# Patient Record
Sex: FEMALE | Race: WHITE | NOT HISPANIC OR LATINO | Employment: OTHER | URBAN - METROPOLITAN AREA
[De-identification: names, ages, dates, MRNs, and addresses within clinical notes are randomized per-mention and may not be internally consistent; named-entity substitution may affect disease eponyms.]

---

## 2017-01-26 ENCOUNTER — PATIENT MESSAGE (OUTPATIENT)
Dept: FAMILY MEDICINE | Facility: CLINIC | Age: 82
End: 2017-01-26

## 2017-01-26 ENCOUNTER — TELEPHONE (OUTPATIENT)
Dept: FAMILY MEDICINE | Facility: CLINIC | Age: 82
End: 2017-01-26

## 2017-01-26 RX ORDER — CIPROFLOXACIN 500 MG/1
TABLET ORAL
Qty: 14 TABLET | Refills: 0 | Status: SHIPPED | OUTPATIENT
Start: 2017-01-26 | End: 2017-07-05

## 2017-01-26 NOTE — TELEPHONE ENCOUNTER
----- Message from Payton Romo sent at 1/26/2017  2:56 PM CST -----  Melissa/chago Brooks called to say sent e-mail to doctor on MyChart   Wants to make sure they hear from before the end of the day      reach at   [phone]

## 2017-01-26 NOTE — TELEPHONE ENCOUNTER
Notified pt's daughter Cordelia that email was forwarded to  and that he will see it when he is finished seeing patients for the day    Angel will fax: [phone]

## 2017-01-27 NOTE — TELEPHONE ENCOUNTER
Can you please clarify this sig?  ciprofloxacin HCl (CIPRO) 500 MG tablet 14 tablet 0 1/26/2017  No      Sig: TK 1 T PO  BID TAT

## 2017-01-27 NOTE — TELEPHONE ENCOUNTER
----- Message from Dalia Bland sent at 1/27/2017 10:10 AM CST -----  Contact: chago..daughter  Need order from  to give cipro rx to mother.she is a resident at [facility]. Fax to [phone]

## 2017-05-25 ENCOUNTER — PATIENT MESSAGE (OUTPATIENT)
Dept: FAMILY MEDICINE | Facility: CLINIC | Age: 82
End: 2017-05-25

## 2017-05-25 ENCOUNTER — TELEPHONE (OUTPATIENT)
Dept: FAMILY MEDICINE | Facility: CLINIC | Age: 82
End: 2017-05-25

## 2017-05-25 DIAGNOSIS — G50.0 TRIGEMINAL NEURALGIA: ICD-10-CM

## 2017-05-25 NOTE — TELEPHONE ENCOUNTER
Per daughter and Angel, Pt only takes two capsules by mouth every evening.  Please send a copy of the script to [facility] [place] at fax [phone]

## 2017-05-25 NOTE — TELEPHONE ENCOUNTER
----- Message from Rae Armas sent at 5/25/2017  3:07 PM CDT -----  Contact: [place] daughter  REFILLS:   Patient is requesting a medication refill.   RX name: Carbamazipine   Strength: 200 mg  Directions: take one in the am and two in pm  Pharmacy name: [facility] (has to be faxed)  Pharmacy phone #: FAX only      Please send to [facility] at fax [phone]

## 2017-05-26 RX ORDER — CARBAMAZEPINE 200 MG/1
CAPSULE, EXTENDED RELEASE ORAL
Qty: 60 CAPSULE | Refills: 3 | Status: SHIPPED | OUTPATIENT
Start: 2017-05-26

## 2017-06-02 ENCOUNTER — TELEPHONE (OUTPATIENT)
Dept: FAMILY MEDICINE | Facility: CLINIC | Age: 82
End: 2017-06-02

## 2017-06-02 RX ORDER — LACTULOSE 10 G/15ML
10 SOLUTION ORAL 3 TIMES DAILY PRN
Qty: 450 ML | Refills: 0 | Status: SHIPPED | OUTPATIENT
Start: 2017-06-02 | End: 2017-06-12

## 2017-06-02 RX ORDER — LACTULOSE 10 G/15ML
10 SOLUTION ORAL 3 TIMES DAILY PRN
Qty: 450 ML | Refills: 0 | Status: SHIPPED | OUTPATIENT
Start: 2017-06-02 | End: 2017-06-02 | Stop reason: SDUPTHER

## 2017-06-02 NOTE — TELEPHONE ENCOUNTER
----- Message from Payton Romo sent at 6/2/2017 12:38 PM CDT -----  tr///can is calling to get Lactulose for patient to help with constipation  Fax script to [facility] in [place]   Reach at   [phone]

## 2017-07-05 ENCOUNTER — OFFICE VISIT (OUTPATIENT)
Dept: INTERNAL MEDICINE | Facility: CLINIC | Age: 82
End: 2017-07-05
Payer: MEDICARE

## 2017-07-05 VITALS
TEMPERATURE: 98 F | SYSTOLIC BLOOD PRESSURE: 120 MMHG | DIASTOLIC BLOOD PRESSURE: 70 MMHG | OXYGEN SATURATION: 96 % | RESPIRATION RATE: 16 BRPM | HEART RATE: 70 BPM | HEIGHT: 63 IN

## 2017-07-05 DIAGNOSIS — F42.4 EXCORIATION (SKIN-PICKING) DISORDER: Primary | ICD-10-CM

## 2017-07-05 DIAGNOSIS — L30.9 ECZEMA, UNSPECIFIED TYPE: ICD-10-CM

## 2017-07-05 PROCEDURE — 1126F AMNT PAIN NOTED NONE PRSNT: CPT | Mod: S$GLB,,, | Performed by: INTERNAL MEDICINE

## 2017-07-05 PROCEDURE — 99213 OFFICE O/P EST LOW 20 MIN: CPT | Mod: S$GLB,,, | Performed by: INTERNAL MEDICINE

## 2017-07-05 PROCEDURE — 1159F MED LIST DOCD IN RCRD: CPT | Mod: S$GLB,,, | Performed by: INTERNAL MEDICINE

## 2017-07-05 RX ORDER — LACTULOSE 10 G/15ML
SOLUTION ORAL; RECTAL
COMMUNITY
Start: 2017-06-03

## 2017-07-05 RX ORDER — IBUPROFEN 400 MG/1
TABLET ORAL
COMMUNITY
Start: 2017-06-06

## 2017-07-05 RX ORDER — METOLAZONE 5 MG/1
TABLET ORAL
COMMUNITY
Start: 2017-07-03

## 2017-07-05 RX ORDER — POTASSIUM CHLORIDE 750 MG/1
TABLET, EXTENDED RELEASE ORAL
COMMUNITY
Start: 2017-05-30 | End: 2017-07-05

## 2017-07-05 NOTE — PROGRESS NOTES
"Subjective:      Patient ID: Carri Mims is a 89 y.o. female.    Chief Complaint: Wound Infection (leaking cyst right arm with itching for two weeks)    HPI: 89y/oWF, has been scratching her right forearm for days. The nurses at [facility]  Would dress it, then on some days leave it open, but now pt has an area of escoriatrion the  Size of an oval quarter. [place] denuded skin, but no cellulitis.      Review of Systems   Constitutional: Negative.    HENT: Negative.    Eyes: Positive for discharge, redness and itching. Negative for visual disturbance.   Respiratory: Negative for cough, choking and shortness of breath.    Cardiovascular: Positive for leg swelling. Negative for chest pain.   Gastrointestinal: Negative.    Endocrine: Negative.    Genitourinary: Negative.    Musculoskeletal:        Sits in wheelchair all day   Allergic/Immunologic: Negative.    Neurological: Negative.    Hematological: Negative.    Psychiatric/Behavioral:        Sleeps firfully in chair all day.       Objective:   /70 (BP Location: Right arm, Patient Position: Sitting, BP Method: Manual)   Pulse 70   Temp 98.1 °F (36.7 °C) (Oral)   Resp 16   Ht 5' 3" (1.6 m)   SpO2 96%     Physical Exam   Constitutional: She appears well-developed and well-nourished.   HENT:   Head: Normocephalic and atraumatic.   Right Ear: External ear normal.   Left Ear: External ear normal.   Nose: Nose normal.   Mouth/Throat: Oropharynx is clear and moist.   Eyes: Right eye exhibits discharge and exudate. Left eye exhibits discharge and exudate.   bilat ectropion   Neck: Normal range of motion. Neck supple.   Cardiovascular: Normal rate and regular rhythm.    Murmur heard.  Pulmonary/Chest: Effort normal and breath sounds normal.   Abdominal: Soft. Bowel sounds are normal.   Musculoskeletal: Edema: 4+ to mid leg.   Skin: Skin is warm. Rash noted.   Blue hue to fet, cool, ecchymotic, petechiae changes to skin   Nursing note and vitals " reviewed.      Assessment:     1. Excoriation (skin-picking) disorder    2. Eczema, unspecified type      Plan:     Excoriation (skin-picking) disorder    Eczema, unspecified type    Dress wound once a day with Neosporin.

## 2017-08-04 ENCOUNTER — PATIENT MESSAGE (OUTPATIENT)
Dept: FAMILY MEDICINE | Facility: CLINIC | Age: 82
End: 2017-08-04

## 2017-08-14 DIAGNOSIS — G20.A1 PARKINSON DISEASE: ICD-10-CM

## 2017-08-14 RX ORDER — CARBIDOPA AND LEVODOPA 25; 100 MG/1; MG/1
TABLET ORAL
Qty: 450 TABLET | Refills: 3 | Status: SHIPPED | OUTPATIENT
Start: 2017-08-14

## 2017-08-14 NOTE — TELEPHONE ENCOUNTER
----- Message from Payton Romo sent at 8/14/2017 11:26 AM CDT -----  Pharmacy calling for refill    [facility]     [phone]  [facility] [address]

## 2017-08-20 ENCOUNTER — PATIENT MESSAGE (OUTPATIENT)
Dept: FAMILY MEDICINE | Facility: CLINIC | Age: 82
End: 2017-08-20

## 2017-09-22 ENCOUNTER — TELEPHONE (OUTPATIENT)
Dept: ELECTROPHYSIOLOGY | Facility: CLINIC | Age: 82
End: 2017-09-22

## 2017-09-26 ENCOUNTER — DOCUMENTATION ONLY (OUTPATIENT)
Dept: FAMILY MEDICINE | Facility: CLINIC | Age: 82
End: 2017-09-26

## 2017-09-26 ENCOUNTER — OFFICE VISIT (OUTPATIENT)
Dept: FAMILY MEDICINE | Facility: CLINIC | Age: 82
End: 2017-09-26
Payer: MEDICARE

## 2017-09-26 VITALS
SYSTOLIC BLOOD PRESSURE: 90 MMHG | DIASTOLIC BLOOD PRESSURE: 60 MMHG | WEIGHT: 200 LBS | OXYGEN SATURATION: 98 % | HEIGHT: 62 IN | HEART RATE: 93 BPM | BODY MASS INDEX: 36.8 KG/M2

## 2017-09-26 DIAGNOSIS — I95.2 HYPOTENSION DUE TO DRUGS: ICD-10-CM

## 2017-09-26 DIAGNOSIS — I50.32 CHRONIC DIASTOLIC HEART FAILURE: ICD-10-CM

## 2017-09-26 DIAGNOSIS — I35.0 AORTIC VALVE STENOSIS, UNSPECIFIED ETIOLOGY: Primary | ICD-10-CM

## 2017-09-26 PROCEDURE — 1126F AMNT PAIN NOTED NONE PRSNT: CPT | Mod: ,,,

## 2017-09-26 PROCEDURE — 1159F MED LIST DOCD IN RCRD: CPT | Mod: ,,,

## 2017-09-26 PROCEDURE — 99213 OFFICE O/P EST LOW 20 MIN: CPT | Mod: PBBFAC,PO

## 2017-09-26 PROCEDURE — 99999 PR PBB SHADOW E&M-EST. PATIENT-LVL III: CPT | Mod: PBBFAC,,,

## 2017-09-26 PROCEDURE — 99214 OFFICE O/P EST MOD 30 MIN: CPT | Mod: S$PBB,,,

## 2017-09-26 RX ORDER — ASPIRIN 325 MG
325 TABLET ORAL DAILY
COMMUNITY

## 2017-09-26 NOTE — PROGRESS NOTES
Subjective:       Patient ID: Carri Mims is a 89 y.o. female.    Chief Complaint: Extremity Weakness    HPI Very weak for the past 4 days. Was informed by Dr. Warren that she should start coreg due to an irregular heart beat. Excessively sleepy. Needs assistance to go to bathroom and has incontinence. Since stopping coreg she is more awake. Hasn't eaten since Saturday night. Not drinking. She is taking zaroxolyn. She is not interested in being hospitalized or having further invasive or life extending procedures.     Review of Systems   Constitutional: Negative.    Respiratory: Negative.  Negative for shortness of breath.    Cardiovascular: Negative.  Negative for chest pain.   Psychiatric/Behavioral: Negative.    All other systems reviewed and are negative.      Objective:      Vitals:    09/26/17 0829   BP: 90/60   Pulse: 93     Physical Exam   Constitutional: She is oriented to person, place, and time. She appears well-developed and well-nourished. She is active.  Non-toxic appearance. She does not have a sickly appearance. She does not appear ill. No distress.   Obese    HENT:   Head: Normocephalic and atraumatic.   Right Ear: External ear normal.   Left Ear: External ear normal.   Nose: Nose normal.   Hearing normal.    Eyes: Conjunctivae are normal. Pupils are equal, round, and reactive to light.   Neck: Normal range of motion. Neck supple. No thyroid mass and no thyromegaly present.   Cardiovascular: Normal rate.  Exam reveals no gallop and no friction rub.    No murmur heard.  Pulses:       Dorsalis pedis pulses are 2+ on the right side, and 2+ on the left side.        Posterior tibial pulses are 2+ on the right side, and 2+ on the left side.   Pulmonary/Chest: Effort normal and breath sounds normal. No respiratory distress. She exhibits no tenderness.   Musculoskeletal: Normal range of motion. She exhibits edema.   4+   Lymphadenopathy:     She has no cervical adenopathy.   Neurological: She is  alert and oriented to person, place, and time. She has normal strength. Coordination and gait normal.   Skin: Skin is warm and dry. She is not diaphoretic. There is erythema. No pallor.   Psychiatric: She has a normal mood and affect. Her speech is normal and behavior is normal. Judgment and thought content normal. Cognition and memory are normal.   Vitals reviewed.            Assessment:       1. Aortic valve stenosis, unspecified etiology    2. Chronic diastolic heart failure    3. Hypotension due to drugs        Plan:       Aortic valve stenosis, unspecified etiology  -     Ambulatory referral to Hospice    Chronic diastolic heart failure  -     Ambulatory referral to Hospice    Hypotension due to drugs  -     Ambulatory referral to Hospice      Return if symptoms worsen or fail to improve.

## 2017-10-02 ENCOUNTER — PATIENT MESSAGE (OUTPATIENT)
Dept: FAMILY MEDICINE | Facility: CLINIC | Age: 82
End: 2017-10-02